# Patient Record
Sex: FEMALE | Race: OTHER | URBAN - NONMETROPOLITAN AREA
[De-identification: names, ages, dates, MRNs, and addresses within clinical notes are randomized per-mention and may not be internally consistent; named-entity substitution may affect disease eponyms.]

---

## 2023-12-03 SDOH — HEALTH STABILITY: PHYSICAL HEALTH: ON AVERAGE, HOW MANY MINUTES DO YOU ENGAGE IN EXERCISE AT THIS LEVEL?: 30 MIN

## 2023-12-03 SDOH — HEALTH STABILITY: PHYSICAL HEALTH: ON AVERAGE, HOW MANY DAYS PER WEEK DO YOU ENGAGE IN MODERATE TO STRENUOUS EXERCISE (LIKE A BRISK WALK)?: 3 DAYS

## 2023-12-05 NOTE — PROGRESS NOTES
[address] [facility] MEDICINE  [address] [address]  Dept: [phone]  Dept Fax: [phone]  Loc: [phone]    Anali Aguilar is a 41 y.o. female who presents today for her medical conditions/complaints as noted below. Chief Complaint   Patient presents with    New Patient     Previous pt Dr. Nettie Garcia in [place]     Hypertension     Pt states she had high readings over the summer. Pt states that she has noticed sx of headaches, pulsating in ears, and blood pumping        HPI:     Patient is here in the office today to establish care. Previous PCP: Dr. Nettie Garcia ([place])  Specialists: [facility])    Past medical history: history of gestational diabetes during last pregnancy in 2018    Surgeries: hemorrhoidectomy and sphincterotomy (2020)    Social history:   - Tobacco: former smoker (0.25 ppd x 7 years)  - Alcohol: occasional/social  - Marijuana: denies  - Other drugs: denies  - Employment: [employer] manager  - Household: lives at home with  and 4 children    Preventative care:   Depression screen: negative today  HIV screen: due; declines  Hepatitis C screen: due; declines  Tdap vaccine: may be due?; declines today  Pap exam: reports normal in 7/2023  Influenza vaccine: due; declines    Patient is taking OCPs for acne, heavy cramping and bleeding associated with her menstrual periods. Symptoms are well controlled. Follows with OB/GYN. Patient also reports that she has had several screenings this summer (OB/GYN appointment and insurance screening) in which she had elevated blood pressure readings. States that the top number was closer to 130 at times. Has a manual cuff at home, but plans to buy an automatic blood pressure cuff to check at home regularly. Does occasionally have headaches in which she feels like her blood pressure is high. History reviewed. No pertinent past medical history.    Past Surgical

## 2023-12-06 ENCOUNTER — OFFICE VISIT (OUTPATIENT)
Dept: FAMILY MEDICINE CLINIC | Age: 38
End: 2023-12-06
Payer: COMMERCIAL

## 2023-12-06 VITALS
HEIGHT: 64 IN | RESPIRATION RATE: 16 BRPM | SYSTOLIC BLOOD PRESSURE: 126 MMHG | WEIGHT: 149.2 LBS | BODY MASS INDEX: 25.47 KG/M2 | OXYGEN SATURATION: 98 % | HEART RATE: 78 BPM | DIASTOLIC BLOOD PRESSURE: 80 MMHG

## 2023-12-06 DIAGNOSIS — Z86.32 HISTORY OF GESTATIONAL DIABETES: ICD-10-CM

## 2023-12-06 DIAGNOSIS — Z00.00 ENCOUNTER FOR WELLNESS EXAMINATION IN ADULT: Primary | ICD-10-CM

## 2023-12-06 DIAGNOSIS — R03.0 ELEVATED BLOOD PRESSURE READING: ICD-10-CM

## 2023-12-06 DIAGNOSIS — Z13.220 LIPID SCREENING: ICD-10-CM

## 2023-12-06 DIAGNOSIS — N94.6 DYSMENORRHEA: ICD-10-CM

## 2023-12-06 PROCEDURE — 99385 PREV VISIT NEW AGE 18-39: CPT | Performed by: STUDENT IN AN ORGANIZED HEALTH CARE EDUCATION/TRAINING PROGRAM

## 2023-12-06 RX ORDER — NORGESTIMATE AND ETHINYL ESTRADIOL 0.25-0.035
KIT ORAL
COMMUNITY
Start: 2019-06-24

## 2023-12-06 ASSESSMENT — ENCOUNTER SYMPTOMS
NAUSEA: 0
COUGH: 0
ABDOMINAL PAIN: 0
SHORTNESS OF BREATH: 0
VOMITING: 0

## 2023-12-06 ASSESSMENT — PATIENT HEALTH QUESTIONNAIRE - PHQ9
SUM OF ALL RESPONSES TO PHQ QUESTIONS 1-9: 0
SUM OF ALL RESPONSES TO PHQ QUESTIONS 1-9: 0
2. FEELING DOWN, DEPRESSED OR HOPELESS: 0
1. LITTLE INTEREST OR PLEASURE IN DOING THINGS: 0
SUM OF ALL RESPONSES TO PHQ QUESTIONS 1-9: 0
SUM OF ALL RESPONSES TO PHQ QUESTIONS 1-9: 0
SUM OF ALL RESPONSES TO PHQ9 QUESTIONS 1 & 2: 0

## 2023-12-28 ENCOUNTER — NURSE ONLY (OUTPATIENT)
Dept: FAMILY MEDICINE CLINIC | Age: 38
End: 2023-12-28
Payer: COMMERCIAL

## 2023-12-28 DIAGNOSIS — R03.0 ELEVATED BLOOD PRESSURE READING: ICD-10-CM

## 2023-12-28 DIAGNOSIS — Z13.220 LIPID SCREENING: ICD-10-CM

## 2023-12-28 DIAGNOSIS — Z86.32 HISTORY OF GESTATIONAL DIABETES: ICD-10-CM

## 2023-12-28 DIAGNOSIS — Z00.00 ENCOUNTER FOR WELLNESS EXAMINATION IN ADULT: ICD-10-CM

## 2023-12-28 LAB
ALBUMIN SERPL BCG-MCNC: 4 G/DL (ref 3.5–5.1)
ALP SERPL-CCNC: 35 U/L (ref 38–126)
ALT SERPL W/O P-5'-P-CCNC: 12 U/L (ref 11–66)
ANION GAP SERPL CALC-SCNC: 8 MEQ/L (ref 8–16)
AST SERPL-CCNC: 15 U/L (ref 5–40)
BASOPHILS ABSOLUTE: 0.1 THOU/MM3 (ref 0–0.1)
BASOPHILS NFR BLD AUTO: 0.8 %
BILIRUB SERPL-MCNC: 0.5 MG/DL (ref 0.3–1.2)
BUN SERPL-MCNC: 8 MG/DL (ref 7–22)
CALCIUM SERPL-MCNC: 8.9 MG/DL (ref 8.5–10.5)
CHLORIDE SERPL-SCNC: 106 MEQ/L (ref 98–111)
CHOLEST SERPL-MCNC: 224 MG/DL (ref 100–199)
CO2 SERPL-SCNC: 25 MEQ/L (ref 23–33)
CREAT SERPL-MCNC: 0.4 MG/DL (ref 0.4–1.2)
DEPRECATED MEAN GLUCOSE BLD GHB EST-ACNC: 87 MG/DL (ref 70–126)
DEPRECATED RDW RBC AUTO: 42.3 FL (ref 35–45)
EOSINOPHIL NFR BLD AUTO: 1.2 %
EOSINOPHILS ABSOLUTE: 0.1 THOU/MM3 (ref 0–0.4)
ERYTHROCYTE [DISTWIDTH] IN BLOOD BY AUTOMATED COUNT: 11.6 % (ref 11.5–14.5)
GFR SERPL CREATININE-BSD FRML MDRD: > 60 ML/MIN/1.73M2
GLUCOSE SERPL-MCNC: 85 MG/DL (ref 70–108)
HBA1C MFR BLD HPLC: 4.9 % (ref 4.4–6.4)
HCT VFR BLD AUTO: 40.1 % (ref 37–47)
HDLC SERPL-MCNC: 68 MG/DL
HGB BLD-MCNC: 13.4 GM/DL (ref 12–16)
IMM GRANULOCYTES # BLD AUTO: 0.01 THOU/MM3 (ref 0–0.07)
IMM GRANULOCYTES NFR BLD AUTO: 0.1 %
LDLC SERPL CALC-MCNC: 129 MG/DL
LYMPHOCYTES ABSOLUTE: 2.6 THOU/MM3 (ref 1–4.8)
LYMPHOCYTES NFR BLD AUTO: 34.7 %
MCH RBC QN AUTO: 33 PG (ref 26–33)
MCHC RBC AUTO-ENTMCNC: 33.4 GM/DL (ref 32.2–35.5)
MCV RBC AUTO: 98.8 FL (ref 81–99)
MONOCYTES ABSOLUTE: 0.3 THOU/MM3 (ref 0.4–1.3)
MONOCYTES NFR BLD AUTO: 3.6 %
NEUTROPHILS NFR BLD AUTO: 59.6 %
NRBC BLD AUTO-RTO: 0 /100 WBC
PLATELET # BLD AUTO: 274 THOU/MM3 (ref 130–400)
PMV BLD AUTO: 11.9 FL (ref 9.4–12.4)
POTASSIUM SERPL-SCNC: 4.3 MEQ/L (ref 3.5–5.2)
PROT SERPL-MCNC: 6.7 G/DL (ref 6.1–8)
RBC # BLD AUTO: 4.06 MILL/MM3 (ref 4.2–5.4)
SEGMENTED NEUTROPHILS ABSOLUTE COUNT: 4.4 THOU/MM3 (ref 1.8–7.7)
SODIUM SERPL-SCNC: 139 MEQ/L (ref 135–145)
TRIGL SERPL-MCNC: 135 MG/DL (ref 0–199)
WBC # BLD AUTO: 7.4 THOU/MM3 (ref 4.8–10.8)

## 2023-12-28 PROCEDURE — 36415 COLL VENOUS BLD VENIPUNCTURE: CPT | Performed by: STUDENT IN AN ORGANIZED HEALTH CARE EDUCATION/TRAINING PROGRAM

## 2024-12-08 ASSESSMENT — PATIENT HEALTH QUESTIONNAIRE - PHQ9
SUM OF ALL RESPONSES TO PHQ QUESTIONS 1-9: 0
SUM OF ALL RESPONSES TO PHQ QUESTIONS 1-9: 0
SUM OF ALL RESPONSES TO PHQ9 QUESTIONS 1 & 2: 0
2. FEELING DOWN, DEPRESSED OR HOPELESS: NOT AT ALL
1. LITTLE INTEREST OR PLEASURE IN DOING THINGS: NOT AT ALL
SUM OF ALL RESPONSES TO PHQ9 QUESTIONS 1 & 2: 0
1. LITTLE INTEREST OR PLEASURE IN DOING THINGS: NOT AT ALL
2. FEELING DOWN, DEPRESSED OR HOPELESS: NOT AT ALL
SUM OF ALL RESPONSES TO PHQ QUESTIONS 1-9: 0
SUM OF ALL RESPONSES TO PHQ QUESTIONS 1-9: 0

## 2024-12-09 NOTE — PROGRESS NOTES
[facility]  [facility]  [address] [address]  Dept: [phone]  Dept Fax: [phone]  Loc: [phone]    Gifty Salvador is a 39 y.o. female who presents today for her medical conditions/complaints as noted below.     Chief Complaint   Patient presents with    Annual Exam    Flu Vaccine       HPI:     Patient presents to the office today for her yearly wellness exam.    Patient is taking OCPs for heavy cramping and bleeding associated with her menstrual periods.  Symptoms are well controlled -- recently switched to a new OCP that is working better for her.  Has noticed some weight gain but this is mild. Follows with OB/GYN ([facility]).    Preventative care:  Vaccines: agreeable to influenza vaccine today  Pap exam: 7/2024 through OBGYN      History reviewed. No pertinent past medical history.   Past Surgical History:   Procedure Laterality Date    ANAL SPHINCTEROTOMY  2020    HEMORRHOID SURGERY  2020       Family History   Problem Relation Age of Onset    High Blood Pressure Mother     Parkinson's Disease Father        Social History     Tobacco Use    Smoking status: Former     Current packs/day: 0.25     Average packs/day: 0.3 packs/day for 7.0 years (1.8 ttl pk-yrs)     Types: Cigarettes    Smokeless tobacco: Never   Substance Use Topics    Alcohol use: Yes     Comment: occasional/social      Current Outpatient Medications   Medication Sig Dispense Refill    LOESTRIN FE 1/20 1-20 MG-MCG per tablet       Blood Pressure Monitoring (BLOOD PRESSURE MONITOR/M CUFF) MISC Use BP cuff to check your blood pressure daily as needed 1 each 0     No current facility-administered medications for this visit.     No Known Allergies    Health Maintenance   Topic Date Due    Varicella vaccine (1 of 2 - 13+ 2-dose series) Never done    Hepatitis B vaccine (1 of 3 - 19+ 3-dose series) Never done    DTaP/Tdap/Td vaccine (1 - Tdap) Never done

## 2024-12-10 ENCOUNTER — OFFICE VISIT (OUTPATIENT)
Dept: FAMILY MEDICINE CLINIC | Age: 39
End: 2024-12-10

## 2024-12-10 VITALS
RESPIRATION RATE: 16 BRPM | BODY MASS INDEX: 27.39 KG/M2 | HEART RATE: 98 BPM | HEIGHT: 64 IN | SYSTOLIC BLOOD PRESSURE: 118 MMHG | OXYGEN SATURATION: 98 % | DIASTOLIC BLOOD PRESSURE: 78 MMHG | WEIGHT: 160.4 LBS

## 2024-12-10 DIAGNOSIS — N94.6 DYSMENORRHEA: ICD-10-CM

## 2024-12-10 DIAGNOSIS — Z23 INFLUENZA VACCINE NEEDED: ICD-10-CM

## 2024-12-10 DIAGNOSIS — Z00.00 ENCOUNTER FOR WELLNESS EXAMINATION IN ADULT: Primary | ICD-10-CM

## 2024-12-10 RX ORDER — NORETHINDRONE ACETATE AND ETHINYL ESTRADIOL AND FERROUS FUMARATE 1MG-20(21)
KIT ORAL
COMMUNITY
Start: 2024-09-01

## 2024-12-10 SDOH — ECONOMIC STABILITY: INCOME INSECURITY: HOW HARD IS IT FOR YOU TO PAY FOR THE VERY BASICS LIKE FOOD, HOUSING, MEDICAL CARE, AND HEATING?: NOT VERY HARD

## 2024-12-10 SDOH — ECONOMIC STABILITY: FOOD INSECURITY: WITHIN THE PAST 12 MONTHS, YOU WORRIED THAT YOUR FOOD WOULD RUN OUT BEFORE YOU GOT MONEY TO BUY MORE.: NEVER TRUE

## 2024-12-10 SDOH — ECONOMIC STABILITY: FOOD INSECURITY: WITHIN THE PAST 12 MONTHS, THE FOOD YOU BOUGHT JUST DIDN'T LAST AND YOU DIDN'T HAVE MONEY TO GET MORE.: NEVER TRUE

## 2024-12-10 ASSESSMENT — ENCOUNTER SYMPTOMS
NAUSEA: 0
COUGH: 0
ABDOMINAL PAIN: 0
SHORTNESS OF BREATH: 0
VOMITING: 0

## 2024-12-10 NOTE — PROGRESS NOTES
After obtaining consent, and per orders of Dr. Abbott, injection of Flucelvax given in Left deltoid by Bonita Su MA. Patient instructed to remain in clinic for 20 minutes afterwards, and to report any adverse reaction to me immediately.    Immunizations Administered       Name Date Dose Route    Influenza, FLUCELVAX, (age 6 mo+) IM, Trivalent PF, 0.5mL 12/10/2024 0.5 mL Intramuscular    Site: Deltoid- Left    Lot: [id number]    NDC: 87673-198-33          Pt tolerated injection well. VIS given to pt, vaccine checklist completed.

## 2025-01-23 ENCOUNTER — OFFICE VISIT (OUTPATIENT)
Dept: FAMILY MEDICINE CLINIC | Age: 40
End: 2025-01-23

## 2025-01-23 VITALS
BODY MASS INDEX: 26.67 KG/M2 | TEMPERATURE: 98.9 F | DIASTOLIC BLOOD PRESSURE: 62 MMHG | OXYGEN SATURATION: 97 % | HEART RATE: 117 BPM | RESPIRATION RATE: 16 BRPM | WEIGHT: 155.4 LBS | SYSTOLIC BLOOD PRESSURE: 130 MMHG

## 2025-01-23 DIAGNOSIS — J02.9 SORE THROAT: ICD-10-CM

## 2025-01-23 DIAGNOSIS — J10.1 INFLUENZA A: Primary | ICD-10-CM

## 2025-01-23 DIAGNOSIS — R50.9 FEVER, UNSPECIFIED FEVER CAUSE: ICD-10-CM

## 2025-01-23 LAB
INFLUENZA VIRUS A RNA: POSITIVE
INFLUENZA VIRUS B RNA: NEGATIVE
Lab: NORMAL
QC PASS/FAIL: NORMAL
SARS-COV-2 RDRP RESP QL NAA+PROBE: NEGATIVE
STREPTOCOCCUS A RNA: NEGATIVE

## 2025-01-23 SDOH — ECONOMIC STABILITY: FOOD INSECURITY: WITHIN THE PAST 12 MONTHS, THE FOOD YOU BOUGHT JUST DIDN'T LAST AND YOU DIDN'T HAVE MONEY TO GET MORE.: PATIENT DECLINED

## 2025-01-23 SDOH — ECONOMIC STABILITY: FOOD INSECURITY: WITHIN THE PAST 12 MONTHS, YOU WORRIED THAT YOUR FOOD WOULD RUN OUT BEFORE YOU GOT MONEY TO BUY MORE.: PATIENT DECLINED

## 2025-01-23 ASSESSMENT — ENCOUNTER SYMPTOMS
NAUSEA: 1
DIARRHEA: 0
SORE THROAT: 1
VOMITING: 0
WHEEZING: 0
RHINORRHEA: 1
COUGH: 1
ABDOMINAL PAIN: 0

## 2025-01-23 ASSESSMENT — PATIENT HEALTH QUESTIONNAIRE - PHQ9: DEPRESSION UNABLE TO ASSESS: PT REFUSES

## 2025-01-23 NOTE — PROGRESS NOTES
[facility]  [facility]  [address] [address]  Dept: [phone]  Dept Fax: [phone]  Loc: [phone]    Gifty Salvador is a 39 y.o. female who presents today for her medical conditions/complaints as noted below.     Chief Complaint   Patient presents with    Fever     X 2 days 102    Sore Throat     X Tuesday     Headache     X Monday     Cough     X Monday     Congestion     X Monday     Letter for School/Work     Work yesterday and today        HPI:     Patient presents to the office today for concerns of flulike symptoms x 3 days.  Patient reports feeling body aches and developing fever and nasal drainage about 3 days ago.  The following day, she continued to have a lot of bodyaches, fever, and developed a cough and significant nasal congestion.  Symptoms persisted throughout yesterday as well.  Has had some nausea off and on, but denies any vomiting, diarrhea, or abdominal pain.  Ears have been full and throat is sore.  She has been taking Motrin, which has worked well, Tylenol, ibuprofen, and Mucinex.  Symptoms have been slightly better today.      History reviewed. No pertinent past medical history.   Past Surgical History:   Procedure Laterality Date    ANAL SPHINCTEROTOMY  2020    HEMORRHOID SURGERY  2020       Family History   Problem Relation Age of Onset    High Blood Pressure Mother     Parkinson's Disease Father        Social History     Tobacco Use    Smoking status: Former     Current packs/day: 0.25     Average packs/day: 0.3 packs/day for 7.0 years (1.8 ttl pk-yrs)     Types: Cigarettes    Smokeless tobacco: Never   Substance Use Topics    Alcohol use: Yes     Comment: occasional/social      Current Outpatient Medications   Medication Sig Dispense Refill    LOESTRIN FE 1/20 1-20 MG-MCG per tablet       Blood Pressure Monitoring (BLOOD PRESSURE MONITOR/M CUFF) MISC Use BP cuff to check your blood pressure daily as needed